# Patient Record
Sex: FEMALE | HISPANIC OR LATINO | URBAN - METROPOLITAN AREA
[De-identification: names, ages, dates, MRNs, and addresses within clinical notes are randomized per-mention and may not be internally consistent; named-entity substitution may affect disease eponyms.]

---

## 2018-05-17 ENCOUNTER — NEW REFERRAL (OUTPATIENT)
Dept: URBAN - METROPOLITAN AREA CLINIC 26 | Facility: CLINIC | Age: 53
End: 2018-05-17

## 2018-05-17 VITALS
WEIGHT: 230 LBS | HEIGHT: 63 IN | BODY MASS INDEX: 40.75 KG/M2 | DIASTOLIC BLOOD PRESSURE: 82 MMHG | SYSTOLIC BLOOD PRESSURE: 108 MMHG | HEART RATE: 68 BPM

## 2018-05-17 DIAGNOSIS — H33.302: ICD-10-CM

## 2018-05-17 DIAGNOSIS — H43.391: ICD-10-CM

## 2018-05-17 DIAGNOSIS — H43.812: ICD-10-CM

## 2018-05-17 PROCEDURE — 67145 PROPH RTA DTCHMNT PC: CPT

## 2018-05-17 PROCEDURE — 92004 COMPRE OPH EXAM NEW PT 1/>: CPT

## 2018-05-17 PROCEDURE — 92225 OPHTHALMOSCOPY (INITIAL): CPT

## 2018-05-17 ASSESSMENT — VISUAL ACUITY
OD_SC: 20/50+2
OS_SC: 20/50
OS_PH: 20/25
OD_PH: 20/30-2

## 2018-05-17 ASSESSMENT — TONOMETRY
OS_IOP_MMHG: 13
OD_IOP_MMHG: 13

## 2018-06-21 ENCOUNTER — POST-OP CHECK (OUTPATIENT)
Dept: URBAN - METROPOLITAN AREA CLINIC 26 | Facility: CLINIC | Age: 53
End: 2018-06-21

## 2018-06-21 DIAGNOSIS — H33.302: ICD-10-CM

## 2018-06-21 PROCEDURE — 99024 POSTOP FOLLOW-UP VISIT: CPT

## 2018-06-21 ASSESSMENT — VISUAL ACUITY
OS_SC: 20/40-1
OD_SC: 20/40-1

## 2018-06-21 ASSESSMENT — TONOMETRY
OD_IOP_MMHG: 14
OS_IOP_MMHG: 19

## 2022-01-13 NOTE — PATIENT DISCUSSION
Upper lid fat is nonfunctional if patient elects both lower lids it will be removed at the same time.

## 2022-03-08 NOTE — PATIENT DISCUSSION
Patient informed of available non-opioid medications and other non-pharmacological options. Discussed advantages and disadvantages of the alternatives, including whether the patient is at-risk for, or has a history of, controlled substance abuse or misuse, and the patient’s personal preferences. [facility] “Opioid Alternative Pamphlet” was provided to patient.